# Patient Record
Sex: MALE | Race: WHITE
[De-identification: names, ages, dates, MRNs, and addresses within clinical notes are randomized per-mention and may not be internally consistent; named-entity substitution may affect disease eponyms.]

---

## 2017-10-12 ENCOUNTER — HOSPITAL ENCOUNTER (OUTPATIENT)
Dept: HOSPITAL 92 - SDC | Age: 72
Discharge: HOME | End: 2017-10-12
Attending: OPHTHALMOLOGY
Payer: MEDICARE

## 2017-10-12 DIAGNOSIS — Z80.1: ICD-10-CM

## 2017-10-12 DIAGNOSIS — I10: ICD-10-CM

## 2017-10-12 DIAGNOSIS — Z98.890: ICD-10-CM

## 2017-10-12 DIAGNOSIS — Z79.899: ICD-10-CM

## 2017-10-12 DIAGNOSIS — F17.210: ICD-10-CM

## 2017-10-12 DIAGNOSIS — Z88.8: ICD-10-CM

## 2017-10-12 DIAGNOSIS — G89.29: ICD-10-CM

## 2017-10-12 DIAGNOSIS — M54.9: ICD-10-CM

## 2017-10-12 DIAGNOSIS — Z79.82: ICD-10-CM

## 2017-10-12 DIAGNOSIS — M19.90: ICD-10-CM

## 2017-10-12 DIAGNOSIS — H35.341: Primary | ICD-10-CM

## 2017-10-12 DIAGNOSIS — Z90.89: ICD-10-CM

## 2017-10-12 DIAGNOSIS — E78.5: ICD-10-CM

## 2017-10-12 PROCEDURE — 08T43ZZ RESECTION OF RIGHT VITREOUS, PERCUTANEOUS APPROACH: ICD-10-PCS | Performed by: OPHTHALMOLOGY

## 2017-10-12 PROCEDURE — 67025 REPLACE EYE FLUID: CPT

## 2017-10-12 PROCEDURE — 08NE3ZZ RELEASE RIGHT RETINA, PERCUTANEOUS APPROACH: ICD-10-PCS | Performed by: OPHTHALMOLOGY

## 2017-10-12 NOTE — OP
DATE OF PROCEDURE:  10/12/2017

 

PREOPERATIVE DIAGNOSIS:  Macular hole, right eye.

 

POSTOPERATIVE DIAGNOSIS:  Macular hole, right eye.

 

PROCEDURE:  Pars plana vitrectomy, internal limiting membrane peel, right eye.

 

SURGEON:  Dr. Lance Chávez

 

ANESTHESIA:  Local with monitored anesthesia care.

 

COMPLICATIONS:  None.

 

PROCEDURE IN DETAIL:  The patient was identified in the preoperative holding area.  Appropriate info
rmed consent for the planned surgical procedure on the right eye had been obtained.  The patient was
 transported to the operative suite where appropriate cardiopulmonary monitoring was established.  L
ocal anesthesia was obtained using retrobulbar and modified Van Lint lid block using 50/50 mixture o
f 4% lidocaine and 0.75% bupivacaine.  The patient was prepped and draped in the usual sterile hany
r for ophthalmic surgery on the right eye.  Lid speculum was placed in the right eye.  The 27 gauge 
trocars were placed in conjunctiva and sclera supratemporally, inferotemporally, and supranasally.  
Infusion line was placed inferotemporally.  Light pipe and vitreous cutter were inserted into the ey
e.  Core of vitrectomy was performed.  Posterior hyaloid face was elevated and peeled into the retin
al periphery using vacuum suction.  Indocyanine green dye was infused onto the posterior pole x1, id
entifying the internal limiting membrane.  This was elevated using membrane scraper and peeled acros
s the macula using end-gripping forceps.  Complete air fluid exchange was performed with 10 minutes 
being allowed for fluid to drain posteriorly.  Indirect ophthalmoscopy was used to examine the retin
a 360 degrees.  No holes, breaks or tears were identified.  A 28% sulfur hexafluoride gas was infuse
d into the eye.  Trocars were removed and eye was noted to retain pressure well.  Retrobulbar [place]
g and subconjunctival Ancef were placed.  Atropine and antibiotic ointment were placed, and the eye 
was patched and shielded.  The patient was taken to the postoperative recovery unit in good conditio
n having suffered no immediate perioperative complications.

 

DISCHARGE INSTRUCTIONS:  The patient was instructed to keep patch and shield on, avoid lifting or be
nding, and follow up in the morning with Dr. Chávez.

## 2021-05-06 ENCOUNTER — HOSPITAL ENCOUNTER (OUTPATIENT)
Dept: HOSPITAL 92 - LABBT | Age: 76
Discharge: HOME | End: 2021-05-06
Attending: OPHTHALMOLOGY
Payer: MEDICARE

## 2021-05-06 DIAGNOSIS — H43.822: ICD-10-CM

## 2021-05-06 DIAGNOSIS — Z01.812: Primary | ICD-10-CM

## 2021-05-06 DIAGNOSIS — Z20.822: ICD-10-CM

## 2021-05-06 PROCEDURE — 87635 SARS-COV-2 COVID-19 AMP PRB: CPT

## 2021-05-06 PROCEDURE — U0005 INFEC AGEN DETEC AMPLI PROBE: HCPCS

## 2021-05-06 PROCEDURE — U0003 INFECTIOUS AGENT DETECTION BY NUCLEIC ACID (DNA OR RNA); SEVERE ACUTE RESPIRATORY SYNDROME CORONAVIRUS 2 (SARS-COV-2) (CORONAVIRUS DISEASE [COVID-19]), AMPLIFIED PROBE TECHNIQUE, MAKING USE OF HIGH THROUGHPUT TECHNOLOGIES AS DESCRIBED BY CMS-2020-01-R: HCPCS

## 2021-05-11 ENCOUNTER — HOSPITAL ENCOUNTER (OUTPATIENT)
Dept: HOSPITAL 92 - SDC | Age: 76
Discharge: HOME | End: 2021-05-11
Attending: OPHTHALMOLOGY
Payer: MEDICARE

## 2021-05-11 VITALS — BODY MASS INDEX: 23.6 KG/M2

## 2021-05-11 DIAGNOSIS — H43.312: ICD-10-CM

## 2021-05-11 DIAGNOSIS — H43.822: Primary | ICD-10-CM

## 2021-05-11 DIAGNOSIS — Z88.8: ICD-10-CM

## 2021-05-11 PROCEDURE — 08T53ZZ RESECTION OF LEFT VITREOUS, PERCUTANEOUS APPROACH: ICD-10-PCS | Performed by: OPHTHALMOLOGY

## 2021-05-11 PROCEDURE — 08NF3ZZ RELEASE LEFT RETINA, PERCUTANEOUS APPROACH: ICD-10-PCS | Performed by: OPHTHALMOLOGY
